# Patient Record
Sex: MALE | Race: WHITE | NOT HISPANIC OR LATINO | Employment: FULL TIME | URBAN - METROPOLITAN AREA
[De-identification: names, ages, dates, MRNs, and addresses within clinical notes are randomized per-mention and may not be internally consistent; named-entity substitution may affect disease eponyms.]

---

## 2022-07-28 ENCOUNTER — OFFICE VISIT (OUTPATIENT)
Dept: URGENT CARE | Facility: CLINIC | Age: 27
End: 2022-07-28
Payer: COMMERCIAL

## 2022-07-28 VITALS
TEMPERATURE: 98.8 F | HEART RATE: 98 BPM | RESPIRATION RATE: 18 BRPM | BODY MASS INDEX: 21.14 KG/M2 | HEIGHT: 71 IN | OXYGEN SATURATION: 98 % | WEIGHT: 151 LBS

## 2022-07-28 DIAGNOSIS — L25.9 CONTACT DERMATITIS, UNSPECIFIED CONTACT DERMATITIS TYPE, UNSPECIFIED TRIGGER: Primary | ICD-10-CM

## 2022-07-28 PROCEDURE — 99203 OFFICE O/P NEW LOW 30 MIN: CPT | Performed by: PHYSICIAN ASSISTANT

## 2022-07-28 RX ORDER — METHYLPREDNISOLONE 4 MG/1
TABLET ORAL
Qty: 21 TABLET | Refills: 0 | Status: SHIPPED | OUTPATIENT
Start: 2022-07-28

## 2022-07-28 RX ORDER — CEPHALEXIN 500 MG/1
500 CAPSULE ORAL EVERY 8 HOURS SCHEDULED
Qty: 15 CAPSULE | Refills: 0 | Status: SHIPPED | OUTPATIENT
Start: 2022-07-28 | End: 2022-08-02

## 2022-07-28 NOTE — PROGRESS NOTES
[facility]        NAME: Ambrose Nix is a 24 y o  male  : 1995    MRN: [id number]  DATE: 2022  TIME: 7:50 PM    Assessment and Plan   Contact dermatitis, unspecified contact dermatitis type, unspecified trigger [L25 9]  1  Contact dermatitis, unspecified contact dermatitis type, unspecified trigger  methylPREDNISolone 4 MG tablet therapy pack    cephalexin (KEFLEX) 500 mg capsule         Patient Instructions     Patient Instructions   Take Medrol Dosepak and antibiotic as instructed  Avoid itching and scratching of affected areas  Can continue over-the-counter antihistamine such as Claritin/Zyrtec/Benadryl  Follow up with PCP in 3-5 days  Proceed to  ER if symptoms worsen  Chief Complaint     Chief Complaint   Patient presents with    Rash     Itchy rash on bilateral legs, poison ? History of Present Illness       Patient is a 28-year-old male presenting today with rash of lower extremities times 3 days  Patient notes a few days ago he was weed whacking in his yd wearing shorts and the day after developed a red itchy rash of his lower legs, has been taking over-the-counter Benadryl which is not provide any relief, notes that the rash has not spread up to his thighs  Denies fever, chills, discharge or drainage, N/V/D, chest tightness, SOB  Review of Systems   Review of Systems   Constitutional: Negative for chills and fever  HENT: Negative for ear pain and sore throat  Eyes: Negative for pain and visual disturbance  Respiratory: Negative for cough and shortness of breath  Cardiovascular: Negative for chest pain and palpitations  Gastrointestinal: Negative for abdominal pain and vomiting  Genitourinary: Negative for dysuria and hematuria  Musculoskeletal: Negative for arthralgias and back pain  Skin: Positive for rash  Neurological: Negative for seizures and syncope  All other systems reviewed and are negative          Current Medications Current Outpatient Medications:     cephalexin (KEFLEX) 500 mg capsule, Take 1 capsule (500 mg total) by mouth every 8 (eight) hours for 5 days, Disp: 15 capsule, Rfl: 0    methylPREDNISolone 4 MG tablet therapy pack, Use as directed on package, Disp: 21 tablet, Rfl: 0    Current Allergies     Allergies as of 07/28/2022    (No Known Allergies)            The following portions of the patient's history were reviewed and updated as appropriate: allergies, current medications, past family history, past medical history, past social history, past surgical history and problem list      History reviewed  No pertinent past medical history  History reviewed  No pertinent surgical history  History reviewed  No pertinent family history  Medications have been verified  Objective   Pulse 98   Temp 98 8 °F (37 1 °C)   Resp 18   Ht 5' 11" (1 803 m)   Wt 68 5 kg (151 lb)   SpO2 98%   BMI 21 06 kg/m²        Physical Exam     Physical Exam  Vitals and nursing note reviewed  Constitutional:       General: He is not in acute distress  Appearance: Normal appearance  He is not ill-appearing  HENT:      Head: Normocephalic and atraumatic  Right Ear: External ear normal       Left Ear: External ear normal       Mouth/Throat:      Mouth: Mucous membranes are moist       Pharynx: Oropharynx is clear  Eyes:      Conjunctiva/sclera: Conjunctivae normal    Cardiovascular:      Rate and Rhythm: Normal rate  Pulses: Normal pulses  Pulmonary:      Effort: Pulmonary effort is normal    Skin:     Capillary Refill: Capillary refill takes less than 2 seconds  Findings: Rash present  Comments: Red papular vesicular rash of lower extremities diffusely bilaterally, crusting present in areas of significant scratching, no surrounding erythema, findings consistent with contact dermatitis, full ROM of lower extremities bilaterally, gross sensation intact, 2+ distal pulses     Neurological: General: No focal deficit present  Mental Status: He is alert and oriented to person, place, and time

## 2022-07-28 NOTE — PATIENT INSTRUCTIONS
Take Medrol Dosepak and antibiotic as instructed  Avoid itching and scratching of affected areas  Can continue over-the-counter antihistamine such as Claritin/Zyrtec/Benadryl

## 2024-01-03 ENCOUNTER — OFFICE VISIT (OUTPATIENT)
Dept: URGENT CARE | Facility: CLINIC | Age: 29
End: 2024-01-03
Payer: COMMERCIAL

## 2024-01-03 VITALS
HEIGHT: 71 IN | TEMPERATURE: 96.9 F | SYSTOLIC BLOOD PRESSURE: 116 MMHG | RESPIRATION RATE: 14 BRPM | OXYGEN SATURATION: 98 % | HEART RATE: 60 BPM | DIASTOLIC BLOOD PRESSURE: 88 MMHG | BODY MASS INDEX: 21.7 KG/M2 | WEIGHT: 155 LBS

## 2024-01-03 DIAGNOSIS — J40 BRONCHITIS WITH ACUTE WHEEZING: Primary | ICD-10-CM

## 2024-01-03 PROCEDURE — 99213 OFFICE O/P EST LOW 20 MIN: CPT | Performed by: STUDENT IN AN ORGANIZED HEALTH CARE EDUCATION/TRAINING PROGRAM

## 2024-01-03 RX ORDER — IPRATROPIUM BROMIDE AND ALBUTEROL SULFATE 2.5; .5 MG/3ML; MG/3ML
3 SOLUTION RESPIRATORY (INHALATION) ONCE
Status: COMPLETED | OUTPATIENT
Start: 2024-01-03 | End: 2024-01-03

## 2024-01-03 RX ORDER — ALBUTEROL SULFATE 90 UG/1
2 AEROSOL, METERED RESPIRATORY (INHALATION) EVERY 6 HOURS PRN
Qty: 8.5 G | Refills: 0 | Status: SHIPPED | OUTPATIENT
Start: 2024-01-03

## 2024-01-03 RX ORDER — BENZONATATE 100 MG/1
100 CAPSULE ORAL 3 TIMES DAILY PRN
Qty: 20 CAPSULE | Refills: 0 | Status: SHIPPED | OUTPATIENT
Start: 2024-01-03

## 2024-01-03 RX ADMIN — IPRATROPIUM BROMIDE AND ALBUTEROL SULFATE 3 ML: 2.5; .5 SOLUTION RESPIRATORY (INHALATION) at 14:14

## 2024-01-03 NOTE — PROGRESS NOTES
[facility]        NAME: Yves Baumann is a 28 y.o. male  : 1995    MRN: [id number]  DATE: January 3, 2024  TIME: 3:33 PM    Assessment and Orders   Bronchitis with acute wheezing [J40]  1. Bronchitis with acute wheezing  ipratropium-albuterol (DUO-NEB) 0.5-2.5 mg/3 mL inhalation solution 3 mL    albuterol (ProAir HFA) 90 mcg/act inhaler    benzonatate (TESSALON PERLES) 100 mg capsule            Plan and Discussion      Symptoms and exam consistent with viral bronchitis, likely viral in nature. Not a candidate for Paxlovid and outside treatment window for Tamiflu, so will forego testing today as it will not . Slight wheezing noted on auscultation which was resolved with one duo neb treatment. Patient prescribed an albuterol inhaler to be used if he develops wheezing or chest tightness with cough. Cough is dry so will also prescribe tessalon Perles for symptom relief.        Discussed ED precautions including (but not limited to)  Difficultly breathing or shortness of breath  Chest pain  Acutely worsening symptoms.     Risks and benefits discussed. Patient understands and agrees with the plan.     Follow up with PCP.     Chief Complaint     Chief Complaint   Patient presents with    Fever     Pt reports fever, cough, body aches, 3x days         History of Present Illness       Fever  Associated symptoms include congestion, coughing, a fever (last fever was yesterday and was 102) and a sore throat. Pertinent negatives include no fatigue or vomiting.       Review of Systems   Review of Systems   Constitutional:  Positive for fever (last fever was yesterday and was 102). Negative for fatigue.   HENT:  Positive for congestion and sore throat.    Respiratory:  Positive for cough.    Gastrointestinal:  Negative for vomiting.         Current Medications       Current Outpatient Medications:     albuterol (ProAir HFA) 90 mcg/act inhaler, Inhale 2 puffs every 6 (six) hours as needed for  "wheezing, Disp: 8.5 g, Rfl: 0    benzonatate (TESSALON PERLES) 100 mg capsule, Take 1 capsule (100 mg total) by mouth 3 (three) times a day as needed for cough, Disp: 20 capsule, Rfl: 0    methylPREDNISolone 4 MG tablet therapy pack, Use as directed on package (Patient not taking: Reported on 1/3/2024), Disp: 21 tablet, Rfl: 0  No current facility-administered medications for this visit.    Current Allergies     Allergies as of 01/03/2024    (No Known Allergies)            The following portions of the patient's history were reviewed and updated as appropriate: allergies, current medications, past family history, past medical history, past social history, past surgical history and problem list.     History reviewed. No pertinent past medical history.    History reviewed. No pertinent surgical history.    History reviewed. No pertinent family history.      Medications have been verified.        Objective   /88   Pulse 60   Temp (!) 96.9 °F (36.1 °C)   Resp 14   Ht 5' 11\" (1.803 m)   Wt 70.3 kg (155 lb)   SpO2 98%   BMI 21.62 kg/m²   No LMP for male patient.       Physical Exam     Physical Exam  Constitutional:       General: He is not in acute distress.     Appearance: He is not ill-appearing.   HENT:      Nose: Congestion present.      Mouth/Throat:      Pharynx: No oropharyngeal exudate or posterior oropharyngeal erythema.   Cardiovascular:      Rate and Rhythm: Normal rate and regular rhythm.   Pulmonary:      Effort: Pulmonary effort is normal.      Breath sounds: Wheezing (mild in bilateral bases) present. No rhonchi or rales.   Neurological:      General: No focal deficit present.      Mental Status: He is alert and oriented to person, place, and time.   Psychiatric:         Mood and Affect: Mood normal.         Behavior: Behavior normal.               Diane Steve DO       " all other ROS negative except as per HPI

## 2025-03-04 ENCOUNTER — OFFICE VISIT (OUTPATIENT)
Dept: URGENT CARE | Facility: CLINIC | Age: 30
End: 2025-03-04
Payer: COMMERCIAL

## 2025-03-04 VITALS
TEMPERATURE: 98.1 F | RESPIRATION RATE: 20 BRPM | SYSTOLIC BLOOD PRESSURE: 108 MMHG | HEIGHT: 72 IN | DIASTOLIC BLOOD PRESSURE: 74 MMHG | OXYGEN SATURATION: 98 % | HEART RATE: 54 BPM | WEIGHT: 153 LBS | BODY MASS INDEX: 20.72 KG/M2

## 2025-03-04 DIAGNOSIS — J06.9 VIRAL URI WITH COUGH: Primary | ICD-10-CM

## 2025-03-04 PROCEDURE — 99213 OFFICE O/P EST LOW 20 MIN: CPT | Performed by: FAMILY MEDICINE

## 2025-03-04 RX ORDER — FLUTICASONE PROPIONATE 50 MCG
1 SPRAY, SUSPENSION (ML) NASAL 2 TIMES DAILY
Qty: 16 G | Refills: 0 | Status: SHIPPED | OUTPATIENT
Start: 2025-03-04

## 2025-03-04 NOTE — PROGRESS NOTES
[facility]        NAME: Yves Baumann is a 29 y.o. male  : 1995    MRN: [id number]  DATE: 2025  TIME: 10:25 AM    Assessment and Plan   Viral URI with cough [J06.9]  1. Viral URI with cough  fluticasone (FLONASE) 50 mcg/act nasal spray        Likely experiencing a viral postnasal drip.  Flonase prescribed to counteract symptoms.  May continue with Delsym.  Recommend gargling with warm salt water and to hydrate with plenty fluids.    Patient Instructions     Follow up with PCP in 3-5 days.  Proceed to  ER if symptoms worsen.    If tests have been performed at [facility], our office will contact you with results if changes need to be made to the care plan discussed with you at the visit.  You can review your full results on [facility].    Chief Complaint     Chief Complaint   Patient presents with    Cough     Cough since Wednesday with congestion         History of Present Illness       29-year-old male presents today with about 5 days of coughing associated with nasal congestion, chills and some chest tightness that occurred yesterday upon waking up.  Denies any obvious fevers, abdominal symptoms or headache.  Denies any obvious sick contacts.    Cough  Associated symptoms include chills. Pertinent negatives include no chest pain, fever, headaches or shortness of breath.       Review of Systems   Review of Systems   Constitutional:  Positive for chills. Negative for fever.   HENT:  Positive for congestion.    Respiratory:  Positive for cough and chest tightness. Negative for shortness of breath.    Cardiovascular:  Negative for chest pain.   Gastrointestinal:  Negative for abdominal pain, nausea and vomiting.   Neurological:  Negative for dizziness and headaches.         Current Medications       Current Outpatient Medications:     fluticasone (FLONASE) 50 mcg/act nasal spray, 1 spray into each nostril 2 (two) times a day, Disp: 16 g, Rfl: 0    albuterol (ProAir HFA) 90 mcg/act  inhaler, Inhale 2 puffs every 6 (six) hours as needed for wheezing (Patient not taking: Reported on 3/4/2025), Disp: 8.5 g, Rfl: 0    benzonatate (TESSALON PERLES) 100 mg capsule, Take 1 capsule (100 mg total) by mouth 3 (three) times a day as needed for cough (Patient not taking: Reported on 3/4/2025), Disp: 20 capsule, Rfl: 0    methylPREDNISolone 4 MG tablet therapy pack, Use as directed on package (Patient not taking: Reported on 3/4/2025), Disp: 21 tablet, Rfl: 0    Current Allergies     Allergies as of 03/04/2025    (No Known Allergies)            The following portions of the patient's history were reviewed and updated as appropriate: allergies, current medications, past family history, past medical history, past social history, past surgical history and problem list.     No past medical history on file.    No past surgical history on file.    No family history on file.      Medications have been verified.        Objective   /74   Pulse (!) 54   Temp 98.1 °F (36.7 °C)   Resp 20   Ht 6' (1.829 m)   Wt 69.4 kg (153 lb)   SpO2 98%   BMI 20.75 kg/m²   No LMP for male patient.       Physical Exam     Physical Exam  Vitals and nursing note reviewed.   Constitutional:       General: He is in acute distress.      Appearance: Normal appearance. He is normal weight. He is not ill-appearing or toxic-appearing.   HENT:      Head: Normocephalic and atraumatic.      Nose: Congestion and rhinorrhea present.      Comments: Inflamed nasal mucosa     Mouth/Throat:      Mouth: Mucous membranes are moist.      Pharynx: No posterior oropharyngeal erythema.   Eyes:      General:         Right eye: No discharge.         Left eye: No discharge.      Conjunctiva/sclera: Conjunctivae normal.   Cardiovascular:      Rate and Rhythm: Normal rate and regular rhythm.   Pulmonary:      Effort: Pulmonary effort is normal. No respiratory distress.      Breath sounds: Normal breath sounds. No wheezing, rhonchi or rales.   Skin:      General: Skin is warm.      Findings: No erythema.   Neurological:      General: No focal deficit present.      Mental Status: He is alert and oriented to person, place, and time.   Psychiatric:         Mood and Affect: Mood normal.         Behavior: Behavior normal.         Thought Content: Thought content normal.         Judgment: Judgment normal.